# Patient Record
Sex: FEMALE | Race: ASIAN | NOT HISPANIC OR LATINO | Employment: PART TIME
[De-identification: names, ages, dates, MRNs, and addresses within clinical notes are randomized per-mention and may not be internally consistent; named-entity substitution may affect disease eponyms.]

---

## 2018-01-21 ENCOUNTER — HEALTH MAINTENANCE LETTER (OUTPATIENT)
Age: 28
End: 2018-01-21

## 2020-03-10 ENCOUNTER — HEALTH MAINTENANCE LETTER (OUTPATIENT)
Age: 30
End: 2020-03-10

## 2020-12-27 ENCOUNTER — HEALTH MAINTENANCE LETTER (OUTPATIENT)
Age: 30
End: 2020-12-27

## 2021-03-27 ENCOUNTER — TRANSFERRED RECORDS (OUTPATIENT)
Dept: MULTI SPECIALTY CLINIC | Facility: CLINIC | Age: 31
End: 2021-03-27

## 2021-03-27 LAB — PAP-ABSTRACT: NORMAL

## 2021-04-13 ENCOUNTER — MEDICAL CORRESPONDENCE (OUTPATIENT)
Dept: HEALTH INFORMATION MANAGEMENT | Facility: CLINIC | Age: 31
End: 2021-04-13

## 2021-04-24 ENCOUNTER — HEALTH MAINTENANCE LETTER (OUTPATIENT)
Age: 31
End: 2021-04-24

## 2021-06-21 ENCOUNTER — MEDICAL CORRESPONDENCE (OUTPATIENT)
Dept: HEALTH INFORMATION MANAGEMENT | Facility: CLINIC | Age: 31
End: 2021-06-21

## 2021-08-03 ENCOUNTER — MEDICAL CORRESPONDENCE (OUTPATIENT)
Dept: HEALTH INFORMATION MANAGEMENT | Facility: CLINIC | Age: 31
End: 2021-08-03

## 2021-10-09 ENCOUNTER — HEALTH MAINTENANCE LETTER (OUTPATIENT)
Age: 31
End: 2021-10-09

## 2021-11-09 ENCOUNTER — IMMUNIZATION (OUTPATIENT)
Dept: NURSING | Facility: CLINIC | Age: 31
End: 2021-11-09
Payer: COMMERCIAL

## 2021-11-09 PROCEDURE — 0001A PR COVID VAC PFIZER DIL RECON 30 MCG/0.3 ML IM: CPT

## 2021-11-09 PROCEDURE — 91300 PR COVID VAC PFIZER DIL RECON 30 MCG/0.3 ML IM: CPT

## 2021-12-02 ENCOUNTER — IMMUNIZATION (OUTPATIENT)
Dept: NURSING | Facility: CLINIC | Age: 31
End: 2021-12-02
Attending: FAMILY MEDICINE
Payer: COMMERCIAL

## 2021-12-02 PROCEDURE — 91300 PR COVID VAC PFIZER DIL RECON 30 MCG/0.3 ML IM: CPT

## 2021-12-02 PROCEDURE — 0002A PR COVID VAC PFIZER DIL RECON 30 MCG/0.3 ML IM: CPT

## 2022-05-21 ENCOUNTER — HEALTH MAINTENANCE LETTER (OUTPATIENT)
Age: 32
End: 2022-05-21

## 2022-09-11 ENCOUNTER — HEALTH MAINTENANCE LETTER (OUTPATIENT)
Age: 32
End: 2022-09-11

## 2023-03-29 ENCOUNTER — MYC MEDICAL ADVICE (OUTPATIENT)
Dept: FAMILY MEDICINE | Facility: CLINIC | Age: 33
End: 2023-03-29

## 2023-03-29 ENCOUNTER — OFFICE VISIT (OUTPATIENT)
Dept: FAMILY MEDICINE | Facility: CLINIC | Age: 33
End: 2023-03-29
Payer: COMMERCIAL

## 2023-03-29 VITALS
WEIGHT: 137.4 LBS | BODY MASS INDEX: 27.7 KG/M2 | SYSTOLIC BLOOD PRESSURE: 109 MMHG | DIASTOLIC BLOOD PRESSURE: 73 MMHG | HEIGHT: 59 IN | RESPIRATION RATE: 12 BRPM | HEART RATE: 77 BPM | OXYGEN SATURATION: 99 % | TEMPERATURE: 97.6 F

## 2023-03-29 DIAGNOSIS — F43.23 ADJUSTMENT DISORDER WITH MIXED ANXIETY AND DEPRESSED MOOD: ICD-10-CM

## 2023-03-29 DIAGNOSIS — R10.10 UPPER ABDOMINAL PAIN: ICD-10-CM

## 2023-03-29 DIAGNOSIS — L03.113 CELLULITIS OF RIGHT UPPER EXTREMITY: Primary | ICD-10-CM

## 2023-03-29 LAB
BASOPHILS # BLD AUTO: 0 10E3/UL (ref 0–0.2)
BASOPHILS NFR BLD AUTO: 0 %
EOSINOPHIL # BLD AUTO: 0.1 10E3/UL (ref 0–0.7)
EOSINOPHIL NFR BLD AUTO: 2 %
ERYTHROCYTE [DISTWIDTH] IN BLOOD BY AUTOMATED COUNT: 12 % (ref 10–15)
HCT VFR BLD AUTO: 40.1 % (ref 35–47)
HGB BLD-MCNC: 13.6 G/DL (ref 11.7–15.7)
IMM GRANULOCYTES # BLD: 0 10E3/UL
IMM GRANULOCYTES NFR BLD: 0 %
LYMPHOCYTES # BLD AUTO: 1.2 10E3/UL (ref 0.8–5.3)
LYMPHOCYTES NFR BLD AUTO: 24 %
MCH RBC QN AUTO: 30.6 PG (ref 26.5–33)
MCHC RBC AUTO-ENTMCNC: 33.9 G/DL (ref 31.5–36.5)
MCV RBC AUTO: 90 FL (ref 78–100)
MONOCYTES # BLD AUTO: 0.5 10E3/UL (ref 0–1.3)
MONOCYTES NFR BLD AUTO: 10 %
NEUTROPHILS # BLD AUTO: 3.3 10E3/UL (ref 1.6–8.3)
NEUTROPHILS NFR BLD AUTO: 64 %
PLATELET # BLD AUTO: 252 10E3/UL (ref 150–450)
RBC # BLD AUTO: 4.45 10E6/UL (ref 3.8–5.2)
WBC # BLD AUTO: 5.2 10E3/UL (ref 4–11)

## 2023-03-29 PROCEDURE — 36415 COLL VENOUS BLD VENIPUNCTURE: CPT | Performed by: NURSE PRACTITIONER

## 2023-03-29 PROCEDURE — 80053 COMPREHEN METABOLIC PANEL: CPT | Performed by: NURSE PRACTITIONER

## 2023-03-29 PROCEDURE — 85025 COMPLETE CBC W/AUTO DIFF WBC: CPT | Performed by: NURSE PRACTITIONER

## 2023-03-29 PROCEDURE — 83690 ASSAY OF LIPASE: CPT | Performed by: NURSE PRACTITIONER

## 2023-03-29 PROCEDURE — 99204 OFFICE O/P NEW MOD 45 MIN: CPT | Performed by: NURSE PRACTITIONER

## 2023-03-29 RX ORDER — CEPHALEXIN 500 MG/1
CAPSULE ORAL
COMMUNITY
Start: 2023-03-28

## 2023-03-29 ASSESSMENT — PAIN SCALES - GENERAL: PAINLEVEL: NO PAIN (0)

## 2023-03-29 NOTE — PROGRESS NOTES
"  Assessment & Plan     Cellulitis of right upper extremity  started keflex this morning. She will monitor and follow up if worsening/not improving.    Upper abdominal pain  Hx gallstones and it feels similar. Could consider omeprazole if workup normal. Could also consider referral to GI.  - US Abdomen Limited; Future  - Comprehensive metabolic panel (BMP + Alb, Alk Phos, ALT, AST, Total. Bili, TP); Future  - Lipase; Future  - CBC with platelets and differential; Future  - Comprehensive metabolic panel (BMP + Alb, Alk Phos, ALT, AST, Total. Bili, TP)  - Lipase  - CBC with platelets and differential    Adjustment disorder with mixed anxiety and depressed mood  Patient requesting referral to new therapist closer to home. No thoughts of harm.  - Adult Mental Health  Referral; Future     MED REC REQUIRED  Post Medication Reconciliation Status:  Discharge medications reconciled, continue medications without change    BMI:   Estimated body mass index is 27.52 kg/m  as calculated from the following:    Height as of this encounter: 1.505 m (4' 11.25\").    Weight as of this encounter: 62.3 kg (137 lb 6.4 oz).       See Patient Instructions    The benefits, risks and potential side effects were discussed in detail. Black box warnings discussed as relevant. All patient questions were answered. The patient was instructed to follow up immediately if any adverse reactions develop.    Return precautions discussed, including when to seek urgent/emergent care.    Patient verbalizes understanding and agrees with plan of care. Patient stable for discharge.      JENNIFER JERRY CNP  [facility]    Debby Hammonds is a 32 year old, presenting for the following health issues:  Hospital F/U    Additional Questions 3/29/2023   Roomed by sabino   Accompanied by Self     Patient Reported Additional Medications 3/29/2023   Patient reports taking the following new medications none     HPI       ED/UC " Followup:    Facility:  [facility]  Date of visit: 03/27/2023  Reason for visit: Cellulitis of right arm  Current Status: still itching and red but no longer feeling soreness, still has discharge    Daughter with skin infection too  Day 1 looked like álvarez  Day 2 raised bump with something in the middle. Covered it up  Day 3 double in size, blister-like and had red line on arm  Felt like was getting sick with fever in the emergency department - 100.2 but no fever since then  Starting antibiotics this morning because Cub had issues  Not pregnant or breastfeeding    Hx gallstones. Feels hot. Last night stabbing pain in the upper abdomen. Felt tender to touch. Burning sensation.  Tried to change diet. Used to get fatty meats - trying to change that.    Currently seeing therapist in [place]. Would like to go to someone local.     Last pap at [facility] 3/27/21        Natalie Rosado MD - 03/27/2023 9:46 PM CDT  Formatting of this note is different from the original.  Images from the original note were not included.  CHIEF COMPLAINT:  Skin problem    HPI:  Initial history obtained at 9:46 PM 03/27/23.     Cassius Sue is a 32 y.o. female who presents to the emergency department for evaluation of a skin problem. Two days ago the patient noticed a small bump on her right arm that looked like a white head and put a Band-Aid on it. Today, she took the Band-Aid off and noticed the bump was weeping, larger, and streaking up her arm. She also had more bumps on her right arm as well as on her chest, prompting her to seek further evaluation at the emergency department. Here, the patient endorses a sore right arm, multiple bumps across her arm and chest, and a fever. She reports that she feels like she is getting sick.    Independent Historian:   None - Patient only    Review of External Notes: None     MEDICATIONS:   ferrous sulfate (FERATAB) 325 mg (65 mg iron) oral  "tablet    ALLERGIES:   No Known Allergies    PAST MEDICAL HISTORY:     Breast mass, right  Anemia complicating pregnancy  Varicella  Tuberculosis    PAST SURGICAL HISTORY:   Breast biopsy, right    SOCIAL HISTORY:   The patient presents alone to the emergency department.    REVIEW OF SYSTEMS:   Review of Systems   Constitutional: Positive for fever.   Musculoskeletal: Positive for myalgias.   Skin: Positive for rash.   All other systems reviewed and are negative.    PHYSICAL EXAM:   Physical Exam  Temperature: 100.2  F (37.9  C) Pulse: 74 Respirations: 18 BP: (!) 121/91 SpO2: 98 %  Constitutional: Patient is resting on the cart, appears in no distress  HENT: Head: Normocephalic and atraumatic.   ENT: Oropharynx is clear and moist.   Cardiovascular: Normal rate. Regular rhythm. Normal heart sounds. Intact distal pulses.   Pulmonary/Chest: Effort normal. Breath sounds are clear bilaterally   Musculoskeletal: Normal range of motion of all four extremities. No edema, tenderness or obvious joint swelling.   Neurological: Alert and oriented to person, place, and time. No focal neurological deficits. Strength and sensation intact throughout.  Skin: Skin is warm and dry. 3 small pustules on right forearm and right antecubital fossa, proximal one is weeping. Red streaking noted up right arm.   Psych: pleasant and cooperative    ED COURSE:    Interventions:   Medications   cephalexin (KEFLEX) capsule 500 mg (has no administration in time range)     Assessments:    Notable Events:    Independent Interpretation (X-rays, CTs, rhythm strip):   None    Consultations/Discussion of Management or Tests:  None     Social Determinants of Health affecting care:   None    ED Vitals:  Patient Vitals for the past 24 hrs:  BP Temp Pulse Resp SpO2 Height   23 1923 (!) 121/91 100.2  F (37.9  C) 74 18 98 % 4' 11\" (1.499 m)     MDM:   Cassius Sue is a 32 y.o. female who presents with redness of her right upper arm in the setting of " having 3 small pustules. The pustule started first and these look possibly like a coxsackievirus or molluscum. She then scratched them open and I think has a secondary cellulitis as there is streaking to her right upper arm. She has a low-grade temperature of 100.2 here. She is otherwise nontoxic in appearance. I gave her a dose of Keflex for the cellulitis. We have placed bacitracin over the pustules and will continue to monitor this what I believe is a viral exanthem. It could be early chickenpox however she had varicella when she was a child. I have asked her to keep them covered for possible contamination. She is given return instructions to include high fevers, increasing redness, spreading of the rash or otherwise feeling worse. She is comfortable this plan will be discharged home with a prescription for Keflex for 7 days.    DIAGNOSIS:  ICD-10-CM   1. Cellulitis of right arm L03.113     2. Viral exanthem B09       DISPOSITION:   Discharged    New Prescriptions   CEPHALEXIN (KEFLEX) 500 MG ORAL CAPSULE Take 1 capsule (500 mg) by mouth three times a day for 7 days.     ATTESTATION:  Scribe Attestation:  I, Santiago Sue, am serving as a scribe to document services personally performed by Natalie Rosado MD, based on my observations and the provider's statements to me.    Provider Attestation:  Portions of this medical record were completed by a scribe. UPON MY REVIEW AND AUTHENTICATION BY ELECTRONIC SIGNATURE, this confirms (a) I performed the applicable clinical services, and (b) the record is accurate.    Natalie Rosado MD  03/27/23  3/27/2023   [facility]    Electronically signed by Natalie Rosado MD at 03/28/2023 1:41 AM CDT        Review of Systems   Constitutional, HEENT, cardiovascular, pulmonary, GI, , musculoskeletal, neuro, skin, endocrine and psych systems are negative, except as otherwise noted.      Objective    /73 (BP Location: Left arm, Patient  "Position: Sitting, Cuff Size: Adult Regular)   Pulse 77   Temp 97.6  F (36.4  C) (Oral)   Resp 12   Ht 1.505 m (4' 11.25\")   Wt 62.3 kg (137 lb 6.4 oz)   LMP 03/15/2023 (Approximate)   SpO2 99%   BMI 27.52 kg/m    Body mass index is 27.52 kg/m .  Physical Exam   GENERAL: healthy, alert and no distress  MS: no gross musculoskeletal defects noted, no edema  SKIN: 3 papules to right forearm with surround erythema (mild)  PSYCH: mentation appears normal, affect normal/bright    Results for orders placed or performed in visit on 03/29/23 (from the past 24 hour(s))   CBC with platelets and differential    Narrative    The following orders were created for panel order CBC with platelets and differential.  Procedure                               Abnormality         Status                     ---------                               -----------         ------                     CBC with platelets and d...[[id number]]                      Final result                 Please view results for these tests on the individual orders.   CBC with platelets and differential   Result Value Ref Range    WBC Count 5.2 4.0 - 11.0 10e3/uL    RBC Count 4.45 3.80 - 5.20 10e6/uL    Hemoglobin 13.6 11.7 - 15.7 g/dL    Hematocrit 40.1 35.0 - 47.0 %    MCV 90 78 - 100 fL    MCH 30.6 26.5 - 33.0 pg    MCHC 33.9 31.5 - 36.5 g/dL    RDW 12.0 10.0 - 15.0 %    Platelet Count 252 150 - 450 10e3/uL    % Neutrophils 64 %    % Lymphocytes 24 %    % Monocytes 10 %    % Eosinophils 2 %    % Basophils 0 %    % Immature Granulocytes 0 %    Absolute Neutrophils 3.3 1.6 - 8.3 10e3/uL    Absolute Lymphocytes 1.2 0.8 - 5.3 10e3/uL    Absolute Monocytes 0.5 0.0 - 1.3 10e3/uL    Absolute Eosinophils 0.1 0.0 - 0.7 10e3/uL    Absolute Basophils 0.0 0.0 - 0.2 10e3/uL    Absolute Immature Granulocytes 0.0 <=0.4 10e3/uL                   "

## 2023-03-29 NOTE — PROGRESS NOTES
"  {PROVIDER CHARTING PREFERENCE:185688}    Debby Hammonds is a 32 year old, presenting for the following health issues:  No chief complaint on file.  No flowsheet data found.  HPI       Hospital Follow-up Visit:    Hospital/Nursing Home/IP Rehab Facility: {INPT AND SNF DISCHARGE:150695}  Date of Admission: ***  Date of Discharge: ***  Reason(s) for Admission: ***    Was your hospitalization related to COVID-19? {Yes add questions_No:426135}  Problems taking medications regularly:  {NONE DEFAULTED:079611::\"None\"}  Medication changes since discharge: {NONE DEFAULTED:532555::\"None\"}  Problems adhering to non-medication therapy:  {NONE DEFAULTED:012794::\"None\"}    Summary of hospitalization:  {HOSPITAL DISCHARGE SUMMARY INFO:066603::\"[facility] hospital discharge summary reviewed\"}  Diagnostic Tests/Treatments reviewed.  Follow up needed: {NONE DEFAULTED:108855::\"none\"}  Other Healthcare Providers Involved in Patient s Care:         {those currently involved after discharge:962642::\"None\"}  Update since discharge: {IMPROVED DEFAULT:830834::\"improved.\"} {TIP  Include information from family/caregivers, SNF, Care Coordination :433333}        Plan of care communicated with {Communicate Plan to:972875::\"patient\"}     {Reference  Coding guidelines- Moderate Complexity F2F/Video within 7 - 14 days of discharge [id number], High Complexity F2F/Video within 7 days 8390528 or [id number] days [id number] :579859}      {additonal problems for provider to add (Optional):912889}  {additonal problems for provider to add (Optional):159122}      Natalie Rsoado MD - 03/27/2023 9:46 PM CDT  Formatting of this note is different from the original.  Images from the original note were not included.  CHIEF COMPLAINT:  Skin problem    HPI:  Initial history obtained at 9:46 PM 03/27/23.     Cassius Sue is a 32 y.o. female who presents to the emergency department for evaluation of a skin problem. Two days ago the patient noticed a small " bump on her right arm that looked like a white head and put a Band-Aid on it. Today, she took the Band-Aid off and noticed the bump was weeping, larger, and streaking up her arm. She also had more bumps on her right arm as well as on her chest, prompting her to seek further evaluation at the emergency department. Here, the patient endorses a sore right arm, multiple bumps across her arm and chest, and a fever. She reports that she feels like she is getting sick.    Independent Historian:   None - Patient only    Review of External Notes: None     MEDICATIONS:   ferrous sulfate (FERATAB) 325 mg (65 mg iron) oral tablet    ALLERGIES:   No Known Allergies    PAST MEDICAL HISTORY:     Breast mass, right  Anemia complicating pregnancy  Varicella  Tuberculosis    PAST SURGICAL HISTORY:   Breast biopsy, right    SOCIAL HISTORY:   The patient presents alone to the emergency department.    REVIEW OF SYSTEMS:   Review of Systems   Constitutional: Positive for fever.   Musculoskeletal: Positive for myalgias.   Skin: Positive for rash.   All other systems reviewed and are negative.    PHYSICAL EXAM:   Physical Exam  Temperature: 100.2  F (37.9  C) Pulse: 74 Respirations: 18 BP: (!) 121/91 SpO2: 98 %  Constitutional: Patient is resting on the cart, appears in no distress  HENT: Head: Normocephalic and atraumatic.   ENT: Oropharynx is clear and moist.   Cardiovascular: Normal rate. Regular rhythm. Normal heart sounds. Intact distal pulses.   Pulmonary/Chest: Effort normal. Breath sounds are clear bilaterally   Musculoskeletal: Normal range of motion of all four extremities. No edema, tenderness or obvious joint swelling.   Neurological: Alert and oriented to person, place, and time. No focal neurological deficits. Strength and sensation intact throughout.  Skin: Skin is warm and dry. 3 small pustules on right forearm and right antecubital fossa, proximal one is weeping. Red streaking noted up right arm.   Psych: pleasant and  "cooperative    ED COURSE:    Interventions:   Medications   cephalexin (KEFLEX) capsule 500 mg (has no administration in time range)     Assessments:    Notable Events:    Independent Interpretation (X-rays, CTs, rhythm strip):   None    Consultations/Discussion of Management or Tests:  None     Social Determinants of Health affecting care:   None    ED Vitals:  Patient Vitals for the past 24 hrs:  BP Temp Pulse Resp SpO2 Height   03/27/23 1923 (!) 121/91 100.2  F (37.9  C) 74 18 98 % 4' 11\" (1.499 m)     MDM:   Cassius Sue is a 32 y.o. female who presents with redness of her right upper arm in the setting of having 3 small pustules. The pustule started first and these look possibly like a coxsackievirus or molluscum. She then scratched them open and I think has a secondary cellulitis as there is streaking to her right upper arm. She has a low-grade temperature of 100.2 here. She is otherwise nontoxic in appearance. I gave her a dose of Keflex for the cellulitis. We have placed bacitracin over the pustules and will continue to monitor this what I believe is a viral exanthem. It could be early chickenpox however she had varicella when she was a child. I have asked her to keep them covered for possible contamination. She is given return instructions to include high fevers, increasing redness, spreading of the rash or otherwise feeling worse. She is comfortable this plan will be discharged home with a prescription for Keflex for 7 days.    DIAGNOSIS:  ICD-10-CM   1. Cellulitis of right arm L03.113     2. Viral exanthem B09       DISPOSITION:   Discharged    New Prescriptions   CEPHALEXIN (KEFLEX) 500 MG ORAL CAPSULE Take 1 capsule (500 mg) by mouth three times a day for 7 days.     ATTESTATION:  Scribe Attestation:  I, Santiago Sue, am serving as a scribe to document services personally performed by Natalie Rosado MD, based on my observations and the provider's statements to me.    Provider Attestation:  Portions " of this medical record were completed by a scribe. UPON MY REVIEW AND AUTHENTICATION BY ELECTRONIC SIGNATURE, this confirms (a) I performed the applicable clinical services, and (b) the record is accurate.    Natalie Rosado MD  03/27/23  3/27/2023   [facility]    Electronically signed by Natalie Rosado MD at 03/28/2023 1:41 AM CDT      Review of Systems   {ROS COMP (Optional):565261}      Objective    There were no vitals taken for this visit.  There is no height or weight on file to calculate BMI.  Physical Exam   {Exam List (Optional):423497}    {Diagnostic Test Results (Optional):857216}    {AMBULATORY ATTESTATION (Optional):517807}

## 2023-03-30 ENCOUNTER — TELEPHONE (OUTPATIENT)
Dept: FAMILY MEDICINE | Facility: CLINIC | Age: 33
End: 2023-03-30
Payer: COMMERCIAL

## 2023-03-30 LAB
ALBUMIN SERPL-MCNC: 4.2 G/DL (ref 3.4–5)
ALP SERPL-CCNC: 133 U/L (ref 40–150)
ALT SERPL W P-5'-P-CCNC: 1308 U/L (ref 0–50)
ANION GAP SERPL CALCULATED.3IONS-SCNC: 5 MMOL/L (ref 3–14)
AST SERPL W P-5'-P-CCNC: 1144 U/L (ref 0–45)
BILIRUB SERPL-MCNC: 1.3 MG/DL (ref 0.2–1.3)
BUN SERPL-MCNC: 16 MG/DL (ref 7–30)
CALCIUM SERPL-MCNC: 9.6 MG/DL (ref 8.5–10.1)
CHLORIDE BLD-SCNC: 106 MMOL/L (ref 94–109)
CO2 SERPL-SCNC: 28 MMOL/L (ref 20–32)
CREAT SERPL-MCNC: 0.58 MG/DL (ref 0.52–1.04)
GFR SERPL CREATININE-BSD FRML MDRD: >90 ML/MIN/1.73M2
GLUCOSE BLD-MCNC: 70 MG/DL (ref 70–99)
LIPASE SERPL-CCNC: 136 U/L (ref 73–393)
POTASSIUM BLD-SCNC: 3.5 MMOL/L (ref 3.4–5.3)
PROT SERPL-MCNC: 8.6 G/DL (ref 6.8–8.8)
SODIUM SERPL-SCNC: 139 MMOL/L (ref 133–144)

## 2023-03-30 NOTE — TELEPHONE ENCOUNTER
I have talked with patient. I shared the results of the labs with her. She reports she had some stabbing heartburn-like pain last night and her back feels hot. I recommend emergency department evaluation now. She would prefer to go to [place] emergency department. I called [place] emergency department and spoke with a physician on duty and they will eval her when she gets there. I called patient back to let them know [facility] is ok to go to - patient will go now. She had no further questions.

## 2023-03-30 NOTE — TELEPHONE ENCOUNTER
Michelle calling regarding critical lab values.     AST 1,144  ALT 1,308    Per Michelle, these are first occurrence within system for patient and is unsure if patient has had similar results before.     Routing to provider review and advise.     Kasey Barton RN

## 2023-07-29 ENCOUNTER — HEALTH MAINTENANCE LETTER (OUTPATIENT)
Age: 33
End: 2023-07-29

## 2023-11-21 ENCOUNTER — LAB REQUISITION (OUTPATIENT)
Dept: LAB | Facility: CLINIC | Age: 33
End: 2023-11-21

## 2023-11-21 DIAGNOSIS — Z33.1 PREGNANT STATE, INCIDENTAL: ICD-10-CM

## 2023-11-21 DIAGNOSIS — R53.83 OTHER FATIGUE: ICD-10-CM

## 2023-11-21 LAB
ERYTHROCYTE [DISTWIDTH] IN BLOOD BY AUTOMATED COUNT: 12.8 % (ref 10–15)
HCG INTACT+B SERPL-ACNC: ABNORMAL MIU/ML
HCT VFR BLD AUTO: 41.9 % (ref 35–47)
HGB BLD-MCNC: 13.7 G/DL (ref 11.7–15.7)
MCH RBC QN AUTO: 30 PG (ref 26.5–33)
MCHC RBC AUTO-ENTMCNC: 32.7 G/DL (ref 31.5–36.5)
MCV RBC AUTO: 92 FL (ref 78–100)
PLATELET # BLD AUTO: 284 10E3/UL (ref 150–450)
RBC # BLD AUTO: 4.57 10E6/UL (ref 3.8–5.2)
WBC # BLD AUTO: 7.7 10E3/UL (ref 4–11)

## 2023-11-21 PROCEDURE — 85014 HEMATOCRIT: CPT | Performed by: NURSE PRACTITIONER

## 2023-11-21 PROCEDURE — 84702 CHORIONIC GONADOTROPIN TEST: CPT | Performed by: NURSE PRACTITIONER

## 2023-12-07 ENCOUNTER — LAB REQUISITION (OUTPATIENT)
Dept: LAB | Facility: CLINIC | Age: 33
End: 2023-12-07

## 2023-12-07 DIAGNOSIS — Z36.9 ENCOUNTER FOR ANTENATAL SCREENING, UNSPECIFIED: ICD-10-CM

## 2023-12-07 LAB
BASOPHILS # BLD AUTO: 0 10E3/UL (ref 0–0.2)
BASOPHILS NFR BLD AUTO: 0 %
EOSINOPHIL # BLD AUTO: 0.1 10E3/UL (ref 0–0.7)
EOSINOPHIL NFR BLD AUTO: 2 %
ERYTHROCYTE [DISTWIDTH] IN BLOOD BY AUTOMATED COUNT: 12.9 % (ref 10–15)
HCT VFR BLD AUTO: 39 % (ref 35–47)
HGB BLD-MCNC: 13.1 G/DL (ref 11.7–15.7)
IMM GRANULOCYTES # BLD: 0 10E3/UL
IMM GRANULOCYTES NFR BLD: 0 %
LYMPHOCYTES # BLD AUTO: 1.7 10E3/UL (ref 0.8–5.3)
LYMPHOCYTES NFR BLD AUTO: 21 %
MCH RBC QN AUTO: 29.8 PG (ref 26.5–33)
MCHC RBC AUTO-ENTMCNC: 33.6 G/DL (ref 31.5–36.5)
MCV RBC AUTO: 89 FL (ref 78–100)
MONOCYTES # BLD AUTO: 0.7 10E3/UL (ref 0–1.3)
MONOCYTES NFR BLD AUTO: 8 %
NEUTROPHILS # BLD AUTO: 5.8 10E3/UL (ref 1.6–8.3)
NEUTROPHILS NFR BLD AUTO: 69 %
NRBC # BLD AUTO: 0 10E3/UL
NRBC BLD AUTO-RTO: 0 /100
PLATELET # BLD AUTO: 309 10E3/UL (ref 150–450)
RBC # BLD AUTO: 4.4 10E6/UL (ref 3.8–5.2)
WBC # BLD AUTO: 8.3 10E3/UL (ref 4–11)

## 2023-12-07 PROCEDURE — 86901 BLOOD TYPING SEROLOGIC RH(D): CPT | Performed by: ADVANCED PRACTICE MIDWIFE

## 2023-12-07 PROCEDURE — 80081 OBSTETRIC PANEL INC HIV TSTG: CPT | Performed by: ADVANCED PRACTICE MIDWIFE

## 2023-12-07 PROCEDURE — 86803 HEPATITIS C AB TEST: CPT | Performed by: ADVANCED PRACTICE MIDWIFE

## 2023-12-07 PROCEDURE — 85014 HEMATOCRIT: CPT | Performed by: ADVANCED PRACTICE MIDWIFE

## 2023-12-07 PROCEDURE — 87340 HEPATITIS B SURFACE AG IA: CPT | Performed by: ADVANCED PRACTICE MIDWIFE

## 2023-12-07 PROCEDURE — 87389 HIV-1 AG W/HIV-1&-2 AB AG IA: CPT | Performed by: ADVANCED PRACTICE MIDWIFE

## 2023-12-07 PROCEDURE — 86850 RBC ANTIBODY SCREEN: CPT | Performed by: ADVANCED PRACTICE MIDWIFE

## 2023-12-07 PROCEDURE — 87086 URINE CULTURE/COLONY COUNT: CPT | Performed by: ADVANCED PRACTICE MIDWIFE

## 2023-12-07 PROCEDURE — 86592 SYPHILIS TEST NON-TREP QUAL: CPT | Performed by: ADVANCED PRACTICE MIDWIFE

## 2023-12-08 LAB
ABO/RH(D): NORMAL
ANTIBODY SCREEN: NEGATIVE
BACTERIA UR CULT: NORMAL
HBV SURFACE AG SERPL QL IA: NONREACTIVE
HCV AB SERPL QL IA: NONREACTIVE
HIV 1+2 AB+HIV1 P24 AG SERPL QL IA: NONREACTIVE
RPR SER QL: NONREACTIVE
RUBV IGG SERPL QL IA: 1.65 INDEX
RUBV IGG SERPL QL IA: POSITIVE
SPECIMEN EXPIRATION DATE: NORMAL

## 2024-03-21 ENCOUNTER — LAB REQUISITION (OUTPATIENT)
Dept: LAB | Facility: CLINIC | Age: 34
End: 2024-03-21

## 2024-03-21 DIAGNOSIS — O99.019 ANEMIA COMPLICATING PREGNANCY, UNSPECIFIED TRIMESTER: ICD-10-CM

## 2024-03-21 LAB
BASOPHILS # BLD AUTO: 0 10E3/UL (ref 0–0.2)
BASOPHILS NFR BLD AUTO: 0 %
EOSINOPHIL # BLD AUTO: 0.1 10E3/UL (ref 0–0.7)
EOSINOPHIL NFR BLD AUTO: 1 %
ERYTHROCYTE [DISTWIDTH] IN BLOOD BY AUTOMATED COUNT: 13.4 % (ref 10–15)
FERRITIN SERPL-MCNC: 8 NG/ML (ref 6–175)
HCT VFR BLD AUTO: 33.4 % (ref 35–47)
HGB BLD-MCNC: 10.8 G/DL (ref 11.7–15.7)
IMM GRANULOCYTES # BLD: 0.1 10E3/UL
IMM GRANULOCYTES NFR BLD: 1 %
LYMPHOCYTES # BLD AUTO: 1.4 10E3/UL (ref 0.8–5.3)
LYMPHOCYTES NFR BLD AUTO: 17 %
MCH RBC QN AUTO: 28.6 PG (ref 26.5–33)
MCHC RBC AUTO-ENTMCNC: 32.3 G/DL (ref 31.5–36.5)
MCV RBC AUTO: 89 FL (ref 78–100)
MONOCYTES # BLD AUTO: 0.4 10E3/UL (ref 0–1.3)
MONOCYTES NFR BLD AUTO: 5 %
NEUTROPHILS # BLD AUTO: 6.4 10E3/UL (ref 1.6–8.3)
NEUTROPHILS NFR BLD AUTO: 76 %
NRBC # BLD AUTO: 0 10E3/UL
NRBC BLD AUTO-RTO: 0 /100
PLATELET # BLD AUTO: 255 10E3/UL (ref 150–450)
RBC # BLD AUTO: 3.77 10E6/UL (ref 3.8–5.2)
WBC # BLD AUTO: 8.5 10E3/UL (ref 4–11)

## 2024-03-21 PROCEDURE — 85025 COMPLETE CBC W/AUTO DIFF WBC: CPT | Performed by: REGISTERED NURSE

## 2024-03-21 PROCEDURE — 82728 ASSAY OF FERRITIN: CPT | Performed by: REGISTERED NURSE

## 2024-05-02 ENCOUNTER — LAB REQUISITION (OUTPATIENT)
Dept: LAB | Facility: CLINIC | Age: 34
End: 2024-05-02

## 2024-05-02 DIAGNOSIS — O99.019 ANEMIA COMPLICATING PREGNANCY, UNSPECIFIED TRIMESTER: ICD-10-CM

## 2024-05-02 LAB
ERYTHROCYTE [DISTWIDTH] IN BLOOD BY AUTOMATED COUNT: 17.2 % (ref 10–15)
HCT VFR BLD AUTO: 32.6 % (ref 35–47)
HGB BLD-MCNC: 10.6 G/DL (ref 11.7–15.7)
MCH RBC QN AUTO: 28.8 PG (ref 26.5–33)
MCHC RBC AUTO-ENTMCNC: 32.5 G/DL (ref 31.5–36.5)
MCV RBC AUTO: 89 FL (ref 78–100)
PLATELET # BLD AUTO: 206 10E3/UL (ref 150–450)
RBC # BLD AUTO: 3.68 10E6/UL (ref 3.8–5.2)
WBC # BLD AUTO: 7.5 10E3/UL (ref 4–11)

## 2024-05-02 PROCEDURE — 85014 HEMATOCRIT: CPT | Performed by: ADVANCED PRACTICE MIDWIFE

## 2024-08-21 ENCOUNTER — LAB REQUISITION (OUTPATIENT)
Dept: LAB | Facility: CLINIC | Age: 34
End: 2024-08-21

## 2024-08-21 DIAGNOSIS — Z12.4 ENCOUNTER FOR SCREENING FOR MALIGNANT NEOPLASM OF CERVIX: ICD-10-CM

## 2024-08-21 PROCEDURE — 87624 HPV HI-RISK TYP POOLED RSLT: CPT | Performed by: ADVANCED PRACTICE MIDWIFE

## 2024-08-21 PROCEDURE — G0145 SCR C/V CYTO,THINLAYER,RESCR: HCPCS | Performed by: ADVANCED PRACTICE MIDWIFE

## 2024-08-22 LAB
HPV HR 12 DNA CVX QL NAA+PROBE: NEGATIVE
HPV16 DNA CVX QL NAA+PROBE: NEGATIVE
HPV18 DNA CVX QL NAA+PROBE: NEGATIVE
HUMAN PAPILLOMA VIRUS FINAL DIAGNOSIS: NORMAL

## 2024-08-26 LAB
BKR AP ASSOCIATED HPV REPORT: NORMAL
BKR LAB AP GYN ADEQUACY: NORMAL
BKR LAB AP GYN INTERPRETATION: NORMAL
BKR LAB AP LMP: NORMAL
BKR LAB AP PREVIOUS ABNL DX: NORMAL
BKR LAB AP PREVIOUS ABNORMAL: NORMAL
PATH REPORT.COMMENTS IMP SPEC: NORMAL
PATH REPORT.COMMENTS IMP SPEC: NORMAL
PATH REPORT.RELEVANT HX SPEC: NORMAL

## 2024-08-30 ENCOUNTER — LAB REQUISITION (OUTPATIENT)
Dept: LAB | Facility: CLINIC | Age: 34
End: 2024-08-30

## 2024-08-30 ENCOUNTER — NURSE TRIAGE (OUTPATIENT)
Dept: FAMILY MEDICINE | Facility: CLINIC | Age: 34
End: 2024-08-30

## 2024-08-30 ENCOUNTER — OFFICE VISIT (OUTPATIENT)
Dept: FAMILY MEDICINE | Facility: CLINIC | Age: 34
End: 2024-08-30
Payer: COMMERCIAL

## 2024-08-30 VITALS
BODY MASS INDEX: 29.35 KG/M2 | SYSTOLIC BLOOD PRESSURE: 102 MMHG | DIASTOLIC BLOOD PRESSURE: 67 MMHG | TEMPERATURE: 98.6 F | WEIGHT: 145.6 LBS | OXYGEN SATURATION: 97 % | RESPIRATION RATE: 14 BRPM | HEIGHT: 59 IN | HEART RATE: 66 BPM

## 2024-08-30 DIAGNOSIS — R60.9 SWELLING: Primary | ICD-10-CM

## 2024-08-30 DIAGNOSIS — Z86.32 PERSONAL HISTORY OF GESTATIONAL DIABETES: ICD-10-CM

## 2024-08-30 DIAGNOSIS — E87.6 HYPOKALEMIA: ICD-10-CM

## 2024-08-30 PROBLEM — O24.419 GESTATIONAL DIABETES MELLITUS: Status: ACTIVE | Noted: 2024-04-01

## 2024-08-30 LAB
ALBUMIN SERPL BCG-MCNC: 3.8 G/DL (ref 3.5–5.2)
ALP SERPL-CCNC: 65 U/L (ref 40–150)
ALT SERPL W P-5'-P-CCNC: 22 U/L (ref 0–50)
ANION GAP SERPL CALCULATED.3IONS-SCNC: 11 MMOL/L (ref 7–15)
AST SERPL W P-5'-P-CCNC: 20 U/L (ref 0–45)
BILIRUB SERPL-MCNC: 0.2 MG/DL
BUN SERPL-MCNC: 10.8 MG/DL (ref 6–20)
CALCIUM SERPL-MCNC: 9 MG/DL (ref 8.8–10.4)
CHLORIDE SERPL-SCNC: 104 MMOL/L (ref 98–107)
CREAT SERPL-MCNC: 0.84 MG/DL (ref 0.51–0.95)
EGFRCR SERPLBLD CKD-EPI 2021: >90 ML/MIN/1.73M2
ERYTHROCYTE [DISTWIDTH] IN BLOOD BY AUTOMATED COUNT: 12.9 % (ref 10–15)
FASTING STATUS PATIENT QL REPORTED: YES
GLUCOSE SERPL-MCNC: 79 MG/DL (ref 70–99)
GLUCOSE SERPL-MCNC: 79 MG/DL (ref 70–99)
HCO3 SERPL-SCNC: 29 MMOL/L (ref 22–29)
HCT VFR BLD AUTO: 37.1 % (ref 35–47)
HGB BLD-MCNC: 12.8 G/DL (ref 11.7–15.7)
MCH RBC QN AUTO: 30.8 PG (ref 26.5–33)
MCHC RBC AUTO-ENTMCNC: 34.5 G/DL (ref 31.5–36.5)
MCV RBC AUTO: 89 FL (ref 78–100)
PLATELET # BLD AUTO: 234 10E3/UL (ref 150–450)
POTASSIUM SERPL-SCNC: 3.1 MMOL/L (ref 3.4–5.3)
PROT SERPL-MCNC: 6.7 G/DL (ref 6.4–8.3)
RBC # BLD AUTO: 4.16 10E6/UL (ref 3.8–5.2)
SODIUM SERPL-SCNC: 144 MMOL/L (ref 135–145)
WBC # BLD AUTO: 7.3 10E3/UL (ref 4–11)

## 2024-08-30 PROCEDURE — 82947 ASSAY GLUCOSE BLOOD QUANT: CPT | Performed by: ADVANCED PRACTICE MIDWIFE

## 2024-08-30 PROCEDURE — 80053 COMPREHEN METABOLIC PANEL: CPT | Performed by: FAMILY MEDICINE

## 2024-08-30 PROCEDURE — 85027 COMPLETE CBC AUTOMATED: CPT | Performed by: FAMILY MEDICINE

## 2024-08-30 PROCEDURE — 36415 COLL VENOUS BLD VENIPUNCTURE: CPT | Performed by: FAMILY MEDICINE

## 2024-08-30 PROCEDURE — 99214 OFFICE O/P EST MOD 30 MIN: CPT | Performed by: FAMILY MEDICINE

## 2024-08-30 NOTE — TELEPHONE ENCOUNTER
"Nurse Triage SBAR    Is this a 2nd Level Triage? YES, LICENSED PRACTITIONER REVIEW IS REQUIRED    Situation: Received a call from the patient stating she has been experiencing some swelling in her fingers (started Sunday, 8/25/24) and face (started yesterday, 8/29/24).     Background: Patient states she actually started to feel sick on Saturday last weekend with nausea, heartburn, and diarrhea. Patient states she never actually vomited but she was experiencing frequent \"acidic burps.\" Patient states she had these symptoms through Tuesday and these symptoms have since improved.     Patient states she was at a party on Sunday evening and tried a new food from [place]. A couple of hours after the patient tied this new food, the patient noticed tingling and burning sensation in her fingers. Patient states she had some of this food yesterday evening again and then this morning she woke up with L sided swelling in her face (some swelling on the R side but the L side is worse).     Patient did have a baby on July 4 (outside the 6 week vijaya for possible preeclampsia per the protocol).     Assessment: No lip or tongue swelling. No difficulty breathing. No swelling issues. No fever. No itchiness. Some pain present (5-6/10, patient describes as being \"uncomfortable\").     Protocol Recommended Disposition:   See in Office Today    Recommendation: Triage protocol recommending patient be seen in the office today. Central scheduling assisted in getting the patient scheduled for an appointment.     Appointments in Next Year      Aug 30, 2024 11:00 AM  (Arrive by 10:40 AM)  Provider Visit with Michelle Viera MD  [facility] ([facility] ) [phone]          Does the patient meet one of the following criteria for ADS visit consideration? 16+ years old, with an [facility] PCP     TIP  Providers, please consider if this condition is appropriate for management at one " "of our Acute and Diagnostic Services sites.     If patient is a good candidate, please use dotphrase <dot>triageresponse and select Refer to ADS to document.    1. ONSET: \"When did the swelling start?\" (e.g., minutes, hours, days)      Started with the fingers swelling and then progressed to the face yesterday  2. LOCATION: \"What part of the face is swollen?\"      L side is worse compared to the R side of the face  3. SEVERITY: \"How swollen is it?\"      Patient describes as a \"rubber band\" around the finger cutting off circulation, \"hot and burning\"  4. ITCHING: \"Is there any itching?\" If Yes, ask: \"How much?\"   (Scale 1-10; mild, moderate or severe)      No itchiness  5. PAIN: \"Is the swelling painful to touch?\" If Yes, ask: \"How painful is it?\"   (Scale 1-10; mild, moderate or severe)    - MODERATE (4-7): interferes with normal activities or awakens from sleep       \"Some pain present. Not unbearable\", pain: 5-6/10 (\"uncomfortable\")  6. FEVER: \"Do you have a fever?\" If Yes, ask: \"What is it, how was it measured, and when did it start?\"       No fever  7. CAUSE: \"What do you think is causing the face swelling?\"      One new food item pt tried from [place] (tried a couple days ago and again last night, tingling and burning sensation in fingers happened a couple of hours after pt ate the new food, snacked on the new food on and off last night), no new lotions, no new laundry detergent, no changes to medications  8. RECURRENT SYMPTOM: \"Have you had face swelling before?\" If Yes, ask: \"When was the last time?\" \"What happened that time?\"      No  9. OTHER SYMPTOMS: \"Do you have any other symptoms?\" (e.g., toothache, leg swelling)      Headache, no difficulty breathing, no difficulty swallowing, no swelling in lips or tongue   10. PREGNANCY: \"Is there any chance you are pregnant?\" \"When was your last menstrual period?\"        No    Reason for Disposition   Patient wants to be seen    Additional Information   Negative: " Life-threatening reaction (anaphylaxis) in the past to similar substance (e.g., food, insect bite/sting, chemical, etc.) and < 2 hours since exposure   Negative: Unresponsive, passed out or very weak   Negative: Swollen tongue   Negative: Difficulty breathing or wheezing   Negative: Sounds like a life-threatening emergency to the triager   Negative: Followed an injury to face   Negative: Bee sting and within last 24 hours   Negative: Mosquito bite suspected   Negative: Insect bite suspected   Negative: Swelling mainly of eyelid(s) and around the eyes   Negative: SEVERE swelling of entire face and < 2 hours since exposure to high-risk allergen (e.g., peanuts, tree nuts, fish, shellfish or 1st dose of drug) and no serious symptoms AND [4] no serious allergic reaction in the past   Negative: Pregnant > 20 weeks   Negative: Postpartum (from 0 to 6 weeks after delivery)   Negative: Fever   Negative: Taking an ACE Inhibitor medicine (e.g., benazepril/LOTENSIN, captopril/CAPOTEN, enalapril/VASOTEC, lisinopril/ZESTRIL)   Negative: Patient sounds very sick or weak to the triager   Negative: SEVERE swelling of the entire face   Negative: Face swelling began after taking a drug   Negative: Looks infected (e.g., spreading redness, pus)   Negative: Looks like a boil or infected sore   Negative: Painful rash with multiple small blisters grouped together (i.e., dermatomal distribution or 'band' or 'stripe')   Negative: Swelling of both lower legs (i.e., bilateral pedal edema)   Negative: Widespread rash on body   Negative: Face swelling is painful to touch   Negative: Toothache    Protocols used: Face Swelling-A-OH    Simona Crump RN

## 2024-08-30 NOTE — PROGRESS NOTES
"  Assessment & Plan     Swelling  Suspect related to dried shrimp snack sodium content - check labs and monitor.  Suggested electrolyte fluids start next week as long as swelling has resulted.  - CBC with platelets; Future  - Comprehensive metabolic panel (BMP + Alb, Alk Phos, ALT, AST, Total. Bili, TP); Future  - CBC with platelets  - Comprehensive metabolic panel (BMP + Alb, Alk Phos, ALT, AST, Total. Bili, TP)          BMI  Estimated body mass index is 29.41 kg/m  as calculated from the following:    Height as of this encounter: 1.499 m (4' 11\").    Weight as of this encounter: 66 kg (145 lb 9.6 oz).   Weight management plan: Discussed healthy diet and exercise guidelines          Debby Hammonds is a 34 year old, presenting for the following health issues:  Swelling (Facial, hands )      8/30/2024    11:03 AM   Additional Questions   Roomed by sabino   Accompanied by self         8/30/2024    11:03 AM   Patient Reported Additional Medications   Patient reports taking the following new medications no     History of Present Illness       Reason for visit:  Swelling in face and fingers  Symptom onset:  3-7 days ago  Symptoms include:  Swelling [place]burn headaches  Symptom intensity:  Mild  Symptom progression:  Worsening  Had these symptoms before:  No  What makes it worse:  No  What makes it better:  No   She is taking medications regularly.     Started with diarrhea on Thursday or Friday. Did not feel good on Saturday. Had some snack (dried Shrimp) from [place] on Sunday. Notice hands and face swollen. Today feels like feet also swollen. Facial still swollen but hands are better.     She had the dried shrimp snack prior to the swelling. She also had it last night night. Intermittent swelling of fingers and hands for 5 days.  Swelling of face this morning and left foot. She is currently breastfeeding. She feels very thirst and is drinking a lot but not sure exactly how much.  No regular exercises.  She is working " "from home and also watching her children (9, 7, 3 yo and 2 mo)    Patient states she is always thirst but peeing a lot.      Objective    /67 (BP Location: Left arm, Patient Position: Sitting, Cuff Size: Adult Regular)   Pulse 66   Temp 98.6  F (37  C) (Oral)   Resp 14   Ht 1.499 m (4' 11\")   Wt 66 kg (145 lb 9.6 oz)   SpO2 97%   BMI 29.41 kg/m    Body mass index is 29.41 kg/m .  Physical Exam   GENERAL: alert and no distress  HENT: ear canals and TM's normal, nose and mouth without ulcers or lesions  NECK: no adenopathy, no asymmetry, masses, or scars  RESP: lungs clear to auscultation - no rales, rhonchi or wheezes  CV: regular rate and rhythm, normal S1 S2, no S3 or S4, no murmur, click or rub, no peripheral edema  ABDOMEN: soft, nontender, no hepatosplenomegaly, no masses and bowel sounds normal  MS: no gross musculoskeletal defects noted, no edema  SKIN: no suspicious lesions or rashes  NEURO: Normal strength and tone, mentation intact and speech normal  PSYCH: mentation appears normal, affect normal/bright            Signed Electronically by: Michelle Viera MD    "

## 2024-09-04 ENCOUNTER — MEDICAL CORRESPONDENCE (OUTPATIENT)
Dept: HEALTH INFORMATION MANAGEMENT | Facility: CLINIC | Age: 34
End: 2024-09-04
Payer: COMMERCIAL

## 2024-09-21 ENCOUNTER — HEALTH MAINTENANCE LETTER (OUTPATIENT)
Age: 34
End: 2024-09-21

## 2024-11-04 ENCOUNTER — MEDICAL CORRESPONDENCE (OUTPATIENT)
Dept: HEALTH INFORMATION MANAGEMENT | Facility: CLINIC | Age: 34
End: 2024-11-04
Payer: COMMERCIAL

## 2025-01-13 ENCOUNTER — OFFICE VISIT (OUTPATIENT)
Dept: FAMILY MEDICINE | Facility: CLINIC | Age: 35
End: 2025-01-13
Payer: COMMERCIAL

## 2025-01-13 VITALS
RESPIRATION RATE: 16 BRPM | SYSTOLIC BLOOD PRESSURE: 101 MMHG | HEIGHT: 59 IN | BODY MASS INDEX: 28.14 KG/M2 | HEART RATE: 77 BPM | TEMPERATURE: 97.1 F | DIASTOLIC BLOOD PRESSURE: 69 MMHG | WEIGHT: 139.6 LBS | OXYGEN SATURATION: 98 %

## 2025-01-13 DIAGNOSIS — Z71.84 TRAVEL ADVICE ENCOUNTER: ICD-10-CM

## 2025-01-13 DIAGNOSIS — Z30.013 ENCOUNTER FOR INITIAL PRESCRIPTION OF INJECTABLE CONTRACEPTIVE: ICD-10-CM

## 2025-01-13 DIAGNOSIS — Z00.00 ROUTINE GENERAL MEDICAL EXAMINATION AT A HEALTH CARE FACILITY: Primary | ICD-10-CM

## 2025-01-13 LAB — HCG UR QL: NEGATIVE

## 2025-01-13 PROCEDURE — 80048 BASIC METABOLIC PNL TOTAL CA: CPT

## 2025-01-13 PROCEDURE — 99395 PREV VISIT EST AGE 18-39: CPT | Mod: 25

## 2025-01-13 PROCEDURE — 96372 THER/PROPH/DIAG INJ SC/IM: CPT

## 2025-01-13 PROCEDURE — 81025 URINE PREGNANCY TEST: CPT

## 2025-01-13 PROCEDURE — 36415 COLL VENOUS BLD VENIPUNCTURE: CPT

## 2025-01-13 RX ORDER — MEDROXYPROGESTERONE ACETATE 150 MG/ML
150 INJECTION, SUSPENSION INTRAMUSCULAR
Status: ACTIVE | OUTPATIENT
Start: 2025-01-13 | End: 2026-01-08

## 2025-01-13 RX ADMIN — MEDROXYPROGESTERONE ACETATE 150 MG: 150 INJECTION, SUSPENSION INTRAMUSCULAR at 16:05

## 2025-01-13 SDOH — HEALTH STABILITY: PHYSICAL HEALTH: ON AVERAGE, HOW MANY DAYS PER WEEK DO YOU ENGAGE IN MODERATE TO STRENUOUS EXERCISE (LIKE A BRISK WALK)?: 0 DAYS

## 2025-01-13 SDOH — HEALTH STABILITY: PHYSICAL HEALTH: ON AVERAGE, HOW MANY MINUTES DO YOU ENGAGE IN EXERCISE AT THIS LEVEL?: 0 MIN

## 2025-01-13 ASSESSMENT — SOCIAL DETERMINANTS OF HEALTH (SDOH): HOW OFTEN DO YOU GET TOGETHER WITH FRIENDS OR RELATIVES?: ONCE A WEEK

## 2025-01-13 NOTE — PATIENT INSTRUCTIONS
Vitamin D: 1000 international unit(s) daily  Patient Education   Preventive Care Advice   This is general advice given by our system to help you stay healthy. However, your care team may have specific advice just for you. Please talk to your care team about your preventive care needs.  Nutrition  Eat 5 or more servings of fruits and vegetables each day.  Try wheat bread, brown rice and whole grain pasta (instead of white bread, rice, and pasta).  Get enough calcium and vitamin D. Check the label on foods and aim for 100% of the RDA (recommended daily allowance).  Lifestyle  Exercise at least 150 minutes each week  (30 minutes a day, 5 days a week).  Do muscle strengthening activities 2 days a week. These help control your weight and prevent disease.  No smoking.  Wear sunscreen to prevent skin cancer.  Have a dental exam and cleaning every 6 months.  Yearly exams  See your health care team every year to talk about:  Any changes in your health.  Any medicines your care team has prescribed.  Preventive care, family planning, and ways to prevent chronic diseases.  Shots (vaccines)   HPV shots (up to age 26), if you've never had them before.  Hepatitis B shots (up to age 59), if you've never had them before.  COVID-19 shot: Get this shot when it's due.  Flu shot: Get a flu shot every year.  Tetanus shot: Get a tetanus shot every 10 years.  Pneumococcal, hepatitis A, and RSV shots: Ask your care team if you need these based on your risk.  Shingles shot (for age 50 and up)  General health tests  Diabetes screening:  Starting at age 35, Get screened for diabetes at least every 3 years.  If you are younger than age 35, ask your care team if you should be screened for diabetes.  Cholesterol test: At age 39, start having a cholesterol test every 5 years, or more often if advised.  Bone density scan (DEXA): At age 50, ask your care team if you should have this scan for osteoporosis (brittle bones).  Hepatitis C: Get tested at  least once in your life.  STIs (sexually transmitted infections)  Before age 24: Ask your care team if you should be screened for STIs.  After age 24: Get screened for STIs if you're at risk. You are at risk for STIs (including HIV) if:  You are sexually active with more than one person.  You don't use condoms every time.  You or a partner was diagnosed with a sexually transmitted infection.  If you are at risk for HIV, ask about PrEP medicine to prevent HIV.  Get tested for HIV at least once in your life, whether you are at risk for HIV or not.  Cancer screening tests  Cervical cancer screening: If you have a cervix, begin getting regular cervical cancer screening tests starting at age 21.  Breast cancer scan (mammogram): If you've ever had breasts, begin having regular mammograms starting at age 40. This is a scan to check for breast cancer.  Colon cancer screening: It is important to start screening for colon cancer at age 45.  Have a colonoscopy test every 10 years (or more often if you're at risk) Or, ask your provider about stool tests like a FIT test every year or Cologuard test every 3 years.  To learn more about your testing options, visit:   .  For help making a decision, visit:   https://bit.ly/aj76885.  Prostate cancer screening test: If you have a prostate, ask your care team if a prostate cancer screening test (PSA) at age 55 is right for you.  Lung cancer screening: If you are a current or former smoker ages 50 to 80, ask your care team if ongoing lung cancer screenings are right for you.  For informational purposes only. Not to replace the advice of your health care provider. Copyright   2023 [employer]. All rights reserved. Clinically reviewed by the [facility]. [employer] [id number] - REV 01/24.

## 2025-01-13 NOTE — PROGRESS NOTES
Preventive Care Visit  [facility]  JENNIFER Andres CNP, Family Medicine  Jan 13, 2025    Assessment & Plan     Routine general medical examination at a health care facility  - Health maintenance and lifestyle reviewed. Updated medical and family history.  - Follow up in one year or sooner as needed.   - REVIEW OF HEALTH MAINTENANCE PROTOCOL ORDERS  - PRIMARY CARE FOLLOW-UP SCHEDULING  - Basic metabolic panel  (Ca, Cl, CO2, Creat, Gluc, K, Na, BUN)    Encounter for initial prescription of injectable contraceptive  - Previously on depo-provera and it worked well and was convenient for her, she would like to restart today. Is considering long-term contraception in the future, counseled on IUDs as well.   - medroxyPROGESTERone (DEPO-PROVERA) injection 150 mg  - HCG qualitative urine    Travel advice encounter  - Travelling to [facility] and [facility], primarily in cities. Referral placed for travel clinic to discuss vaccines, malaria prophylaxis not needed per [facility] travel website.   - Travel Clinic Referral    Counseling  Appropriate preventive services were addressed with this patient via screening, questionnaire, or discussion as appropriate for fall prevention, nutrition, physical activity, Tobacco-use cessation, social engagement, weight loss and cognition.  Checklist reviewing preventive services available has been given to the patient.  Reviewed patient's diet, addressing concerns and/or questions.   The patient was instructed to see the dentist every 6 months.     Due for annual physical in one year, otherwise follow up on as-needed basis.    Subjective   Cassius is a 34 year old, presenting for the following:  Physical        1/13/2025     2:49 PM   Additional Questions   Roomed by Merino   Accompanied by child         1/13/2025     2:49 PM   Patient Reported Additional Medications   Patient reports taking the following new medications no      HPI  Presents for annual physical. Would  like to restart contraception, has been on depo in the past. Not planning to have more children, considering long term options. Daughter Laurel born in July 2024. Had some postpartum depression, has been better with therapy.     Health Care Directive  Patient does not have a Health Care Directive: Discussed advance care planning with patient; information given to patient to review.        1/13/2025   General Health   How would you rate your overall physical health? Good   Feel stress (tense, anxious, or unable to sleep) Only a little   (!) STRESS CONCERN      1/13/2025   Nutrition   Three or more servings of calcium each day? Yes   Diet: Regular (no restrictions)   How many servings of fruit and vegetables per day? (!) 2-3   How many sweetened beverages each day? 0-1         1/13/2025   Exercise   Days per week of moderate/strenous exercise 0 days   Average minutes spent exercising at this level 0 min   (!) EXERCISE CONCERN      1/13/2025   Social Factors   Frequency of gathering with friends or relatives Once a week   Worry food won't last until get money to buy more No   Food not last or not have enough money for food? No   Do you have housing? (Housing is defined as stable permanent housing and does not include staying ouside in a car, in a tent, in an abandoned building, in an overnight shelter, or couch-surfing.) Yes   Are you worried about losing your housing? No   Lack of transportation? No   Unable to get utilities (heat,electricity)? No         1/13/2025   Dental   Dentist two times every year? (!) NO         1/13/2025   TB Screening   Were you born outside of the [place]? Yes     Today's PHQ-2 Score:       1/13/2025     2:52 PM   PHQ-2 ( 1999 Pfizer)   Q1: Little interest or pleasure in doing things 1   Q2: Feeling down, depressed or hopeless 1   PHQ-2 Score 2    Q1: Little interest or pleasure in doing things Several days   Q2: Feeling down, depressed or hopeless Several days   PHQ-2 Score 2        "Patient-reported         1/13/2025   Substance Use   Alcohol more than 3/day or more than 7/wk No   Do you use any other substances recreationally? No     Social History     Tobacco Use    Smoking status: Never    Smokeless tobacco: Never   Vaping Use    Vaping status: Never Used   Substance Use Topics    Alcohol use: No    Drug use: No     Mammogram Screening - Patient under 40 years of age: Routine Mammogram Screening not recommended.         1/13/2025   STI Screening   New sexual partner(s) since last STI/HIV test? No     History of abnormal Pap smear: No - age 30- 64 PAP with HPV every 5 years recommended        Latest Ref Rng & Units 8/21/2024    10:10 AM 3/27/2021    10:20 AM 12/19/2014    11:36 AM   PAP / HPV   PAP  Negative for Intraepithelial Lesion or Malignancy (NILM)   Negative for squamous intraepithelial lesion or malignancy  Electronically signed by Jamila Webster CT (ASCP) on 12/29/2014 at 11:02 AM      HPV 16 DNA Negative Negative      HPV 18 DNA Negative Negative      Other HR HPV Negative Negative      PAP-ABSTRACT   See Scanned Document            This result is from an external source.         1/13/2025   Contraception/Family Planning   Questions about contraception or family planning (!) YES      Reviewed and updated as needed this visit by Provider   Tobacco   Meds  Problems  Med Hx  Surg Hx  Fam Hx  Soc Hx Sexual   Activity        Review of Systems  Constitutional, HEENT, cardiovascular, pulmonary, gi and gu systems are negative, except as otherwise noted.     Objective    Exam  /69 (BP Location: Left arm, Patient Position: Sitting, Cuff Size: Adult Regular)   Pulse 77   Temp 97.1  F (36.2  C) (Temporal)   Resp 16   Ht 1.499 m (4' 11\")   Wt 63.3 kg (139 lb 9.6 oz)   LMP 01/08/2025 (Approximate)   SpO2 98%   BMI 28.20 kg/m     Estimated body mass index is 28.2 kg/m  as calculated from the following:    Height as of this encounter: 1.499 m (4' 11\").    Weight as of " this encounter: 63.3 kg (139 lb 9.6 oz).    Physical Exam  GENERAL: alert and no distress  EYES: Eyes grossly normal to inspection, PERRL and conjunctivae and sclerae normal  HENT: ear canals and TM's normal, nose and mouth without ulcers or lesions  NECK: no adenopathy, no asymmetry, masses, or scars  RESP: lungs clear to auscultation - no rales, rhonchi or wheezes  CV: regular rate and rhythm, normal S1 S2, no S3 or S4, no murmur, click or rub, no peripheral edema  ABDOMEN: soft, nontender, no hepatosplenomegaly, no masses and bowel sounds normal  MS: no gross musculoskeletal defects noted, no edema  SKIN: no suspicious lesions or rashes  NEURO: Normal strength and tone, mentation intact and speech normal  PSYCH: mentation appears normal, affect normal/bright    Signed Electronically by: JENNIFER Andres CNP

## 2025-01-14 LAB
ANION GAP SERPL CALCULATED.3IONS-SCNC: 11 MMOL/L (ref 7–15)
BUN SERPL-MCNC: 16.2 MG/DL (ref 6–20)
CALCIUM SERPL-MCNC: 9.5 MG/DL (ref 8.8–10.4)
CHLORIDE SERPL-SCNC: 104 MMOL/L (ref 98–107)
CREAT SERPL-MCNC: 0.59 MG/DL (ref 0.51–0.95)
EGFRCR SERPLBLD CKD-EPI 2021: >90 ML/MIN/1.73M2
GLUCOSE SERPL-MCNC: 109 MG/DL (ref 70–99)
HCO3 SERPL-SCNC: 26 MMOL/L (ref 22–29)
POTASSIUM SERPL-SCNC: 3.9 MMOL/L (ref 3.4–5.3)
SODIUM SERPL-SCNC: 141 MMOL/L (ref 135–145)

## 2025-02-19 ENCOUNTER — E-VISIT (OUTPATIENT)
Dept: FAMILY MEDICINE | Facility: CLINIC | Age: 35
End: 2025-02-19
Payer: COMMERCIAL

## 2025-02-19 DIAGNOSIS — Z71.84 TRAVEL ADVICE ENCOUNTER: Primary | ICD-10-CM

## 2025-02-20 ENCOUNTER — OFFICE VISIT (OUTPATIENT)
Dept: FAMILY MEDICINE | Facility: CLINIC | Age: 35
End: 2025-02-20
Payer: COMMERCIAL

## 2025-02-20 ENCOUNTER — TELEPHONE (OUTPATIENT)
Dept: FAMILY MEDICINE | Facility: CLINIC | Age: 35
End: 2025-02-20

## 2025-02-20 VITALS
OXYGEN SATURATION: 97 % | RESPIRATION RATE: 16 BRPM | HEART RATE: 94 BPM | SYSTOLIC BLOOD PRESSURE: 95 MMHG | WEIGHT: 134.5 LBS | DIASTOLIC BLOOD PRESSURE: 65 MMHG | HEIGHT: 59 IN | BODY MASS INDEX: 27.12 KG/M2 | TEMPERATURE: 98.2 F

## 2025-02-20 DIAGNOSIS — Z23 NEED FOR HEPATITIS A IMMUNIZATION: ICD-10-CM

## 2025-02-20 DIAGNOSIS — Z71.84 ENCOUNTER FOR COUNSELING FOR TRAVEL: Primary | ICD-10-CM

## 2025-02-20 RX ORDER — ONDANSETRON 4 MG/1
4 TABLET, ORALLY DISINTEGRATING ORAL EVERY 6 HOURS PRN
Qty: 30 TABLET | Refills: 0 | Status: SHIPPED | OUTPATIENT
Start: 2025-02-20

## 2025-02-20 RX ORDER — LOPERAMIDE HYDROCHLORIDE 2 MG/1
2 TABLET ORAL 4 TIMES DAILY PRN
Qty: 40 TABLET | Refills: 0 | Status: SHIPPED | OUTPATIENT
Start: 2025-02-20

## 2025-02-20 RX ORDER — AZITHROMYCIN 500 MG/1
TABLET, FILM COATED ORAL
Qty: 3 TABLET | Refills: 0 | Status: SHIPPED | OUTPATIENT
Start: 2025-02-20

## 2025-02-20 NOTE — TELEPHONE ENCOUNTER
Received a call from pt,     She is requesting to schedule a travel appointment.     Assisted pt in scheduling a travel appt with Peter Akers PA-C at 12:45 PM.     Pt verbalizes understanding and is agreeable with plan. She states she is aware of AV location.  Pt denies any further questions or concerns at this time.     Shala ADKINS RN   Clinic RN  [facility]

## 2025-02-20 NOTE — PROGRESS NOTES
SUBJECTIVE: Cassius Sue , a 34 year old  female, presents for counseling and information regarding upcoming travel to [facility] and [facility]. Special medical concerns include: none. She anticipates the following unusual exposures: none.    Itinerary:  [facility] and [place] [facility]    Departure Date: 2/21/25 Return date: 3/11/25    Reason for travel (i.e. Business, pleasure): business    Visiting an urban or rural area?: urban    Accommodations (i.e. hotel, hostel, friends, family, etc): hotel    Women - First day of your last period: 2/18/25    IMMUNIZATION HISTORY  Have you received any vaccinations in the past 4 weeks? If so, which? Yes covid and thypoid  Have you ever fainted from having your blood drawn or from an injection?  No  Have you ever had any bad reaction or side effect from any vaccination?  If so, which? No  Do you live (or work closely) with anyone who has AIDS, an AIDS-like condition, any other immune disorder or who is on chemotherapy for cancer?  No  Have you received any injection of immune globulin or any blood products during the past 12 months?  No    GENERAL MEDICAL HISTORY  Do you have a medical condition that requires medicine or doctor follow-up visits?  No  Do you have a medical condition that is stable now, but that may recur while traveling?  No  Has your spleen been removed?  No  Have you had an illness or a fever in the past 48 hours?  No  Are you pregnant, or might you become pregnant on this trip?  Any chance of pregnancy?  No  Are you breastfeeding?  yes  Do you have HIV, AIDS, an AIDS-like condition, any other immune disorder, leukemia or cancer?  No  Have you had your thymus gland removed or history of problems with your thymus, such as myasthenia gravis, DiGeorge syndrome, or thymoma?  No  Do you have a severely low platelet count (thrombocytopenia) or a blood clotting disorder?  No  Have you ever had a convulsion, seizure, epilepsy, neurologic condition or brain  infection?  No  Do you have any stomach conditions?  No  Do you have severe renal or kidney impairment?  No  Do you have a history of mental health concerns?  No  Do you get yeast infections often?  No  Do you have psoriasis?  No  Do you get motion sickness?  yes  Have you ever had headaches, nausea, vomiting, or breathing problems from altitude exposure?  No    MEDICINES  Are you taking:   Steroids, prednisone, anti-cancer drugs, or medicines that suppress your immune system? No  Antibiotics or sulfonamides? No  Oral contraceptives (birth control pills)? No  Aspirin therapy (children and teens)? No    ALLERGIES  Are you allergic to:  Any medicines? No  Any foods or other? No  Neomycin, formalin, or fish products? No      Past Medical History:   Diagnosis Date    Scoliosis       Immunization History   Administered Date(s) Administered    COVID-19 12+ (Pfizer) 02/18/2025    COVID-19 MONOVALENT 12+ (Pfizer) 11/09/2021, 12/02/2021    TDAP (Adacel,Boostrix) 04/06/2015, 04/16/2015, 07/06/2016, 11/17/2020, 04/18/2024    Td, Absorbed, Pf, Adult, Lf Unspecified 04/06/2015    Typhoid IM 02/18/2025       No current outpatient medications on file.     No Known Allergies     EXAM: deferred    Immunizations discussed include: Hepatitis A  Malaria prophylaxis recommended: not needed  Symptomatic treatment for traveler's diarrhea: bismuth subsalicylate, loperamide, and azithromycin    ASSESSMENT/PLAN:    (Z71.84) Encounter for counseling for travel  (primary encounter diagnosis)    Comment: Hepatitis A vaccines today. Patient will return or follow-up with PCP as needed. Prophylaxis given for Traveler's diarrhea and is not needed for Malaria. All questions were answered. Patient was informed that vaccines may not be covered and should check with insurance company to be sure. They have decided to proceed with vaccines ordered today.    Plan: loperamide (IMODIUM A-D) 2 MG tablet,         azithromycin (ZITHROMAX) 500 MG tablet,          ondansetron (ZOFRAN ODT) 4 MG ODT tab            (Z23) Need for hepatitis A immunization  Comment:   Plan: HEPATITIS A 19+ (HAVRIX/VAQTA)              I have reviewed general recommendations for safe travel   including: food/water precautions, insect avoidance, safe sex   practices given high prevalence of HIV and other STDs,   roadway safety. Educational materials and links to the CDC   Traveler's health website have been provided.    Total time 15 minutes, greater than 50 percent in counseling   and coordination of care.

## 2025-02-20 NOTE — PATIENT INSTRUCTIONS
"See travel packet provided  Recommend ultrathon (mosquito repellant), pepto bismol and imodium  The food and drink choices you make while traveling can impact your likelihood of getting sick.   If you aren't sure if a food or drink is safe, the saying \" BOIL IT, COOK IT, PEEL IT, OR FORGET IT\" can help you decide whether it's okay to consume.   Also bring hand  and sun screen with you.  Safe Travels     If you first start to get mild to moderate diarrhea, take imodium.      If diarrhea is severe or you have a fever with the diarrhea, take the antibiotic (azithromycin).      Today February 20, 2025 you received the    Hepatitis A Vaccine - Please return on 8/20/25 or later for your 2nd and final dose..    These appointments can be made as a NURSE ONLY visit.    **It is very important for the vaccinations to be given on the scheduled day(s), this helps ensure you receive the full effectiveness of the vaccine.**    Please call [facility] with any questions [phone]    Thank you for visiting [facility]    "

## 2025-02-20 NOTE — PROGRESS NOTES
Prior to immunization administration, verified patients identity using patient s name and date of birth. Please see Immunization Activity for additional information.     Screening Questionnaire for Adult Immunization    Are you sick today?   No   Do you have allergies to medications, food, a vaccine component or latex?   No   Have you ever had a serious reaction after receiving a vaccination?   No   Do you have a long-term health problem with heart, lung, kidney, or metabolic disease (e.g., diabetes), asthma, a blood disorder, no spleen, complement component deficiency, a cochlear implant, or a spinal fluid leak?  Are you on long-term aspirin therapy?   No   Do you have cancer, leukemia, HIV/AIDS, or any other immune system problem?   No   Do you have a parent, brother, or sister with an immune system problem?   No   In the past 3 months, have you taken medications that affect  your immune system, such as prednisone, other steroids, or anticancer drugs; drugs for the treatment of rheumatoid arthritis, Crohn s disease, or psoriasis; or have you had radiation treatments?   No   Have you had a seizure, or a brain or other nervous system problem?   No   During the past year, have you received a transfusion of blood or blood    products, or been given immune (gamma) globulin or antiviral drug?   No   For women: Are you pregnant or is there a chance you could become       pregnant during the next month?   No   Have you received any vaccinations in the past 4 weeks?   Yes covid and thypoid     Immunization questionnaire was positive for at least one answer.  Notified Peter Akers.      Patient instructed to remain in clinic for 15 minutes afterwards, and to report any adverse reactions.     Screening performed by Marianne Peña MA on 2/20/2025 at 12:55 PM.

## 2025-02-20 NOTE — TELEPHONE ENCOUNTER
Patient seen for in-person visit in travel clinic.    Provider E-Visit time total (minutes): 2